# Patient Record
Sex: FEMALE | Race: WHITE
[De-identification: names, ages, dates, MRNs, and addresses within clinical notes are randomized per-mention and may not be internally consistent; named-entity substitution may affect disease eponyms.]

---

## 2018-01-08 ENCOUNTER — HOSPITAL ENCOUNTER (OUTPATIENT)
Dept: HOSPITAL 42 - ED | Age: 28
Setting detail: OBSERVATION
LOS: 1 days | Discharge: HOME | End: 2018-01-09
Attending: INTERNAL MEDICINE | Admitting: INTERNAL MEDICINE
Payer: MEDICAID

## 2018-01-08 VITALS — BODY MASS INDEX: 43 KG/M2

## 2018-01-08 DIAGNOSIS — D50.9: Primary | ICD-10-CM

## 2018-01-08 DIAGNOSIS — Z98.84: ICD-10-CM

## 2018-01-08 DIAGNOSIS — Z87.891: ICD-10-CM

## 2018-01-08 LAB
ALBUMIN SERPL-MCNC: 4.2 G/DL (ref 3–4.8)
ALBUMIN/GLOB SERPL: 1.2 {RATIO} (ref 1.1–1.8)
ALT SERPL-CCNC: 33 U/L (ref 7–56)
APTT BLD: 29.3 SECONDS (ref 25.1–36.5)
AST SERPL-CCNC: 31 U/L (ref 14–36)
BASOPHILS # BLD AUTO: 0.02 K/MM3 (ref 0–2)
BASOPHILS NFR BLD: 0.2 % (ref 0–3)
BUN SERPL-MCNC: 13 MG/DL (ref 7–21)
CALCIUM SERPL-MCNC: 8.4 MG/DL (ref 8.4–10.5)
EOSINOPHIL # BLD: 0.3 10*3/UL (ref 0–0.7)
EOSINOPHIL NFR BLD: 3.9 % (ref 1.5–5)
ERYTHROCYTE [DISTWIDTH] IN BLOOD BY AUTOMATED COUNT: 19.1 % (ref 11.5–14.5)
GFR NON-AFRICAN AMERICAN: > 60
GRANULOCYTES # BLD: 4.68 10*3/UL (ref 1.4–6.5)
GRANULOCYTES NFR BLD: 57.7 % (ref 50–68)
HGB BLD-MCNC: 8.1 G/DL (ref 12–16)
INR PPP: 0.98 (ref 0.93–1.08)
LYMPHOCYTES # BLD: 2.5 10*3/UL (ref 1.2–3.4)
LYMPHOCYTES NFR BLD AUTO: 30.1 % (ref 22–35)
MCH RBC QN AUTO: 19.1 PG (ref 25–35)
MCHC RBC AUTO-ENTMCNC: 28.4 G/DL (ref 31–37)
MCV RBC AUTO: 67.4 FL (ref 80–105)
MONOCYTES # BLD AUTO: 0.7 10*3/UL (ref 0.1–0.6)
MONOCYTES NFR BLD: 8.1 % (ref 1–6)
PLATELET # BLD: 147 10^3/UL (ref 120–450)
PROTHROMBIN TIME: 11.2 SECONDS (ref 9.4–12.5)
RBC # BLD AUTO: 4.23 10^6/UL (ref 3.5–6.1)
WBC # BLD AUTO: 8.1 10^3/UL (ref 4.5–11)

## 2018-01-08 PROCEDURE — 85041 AUTOMATED RBC COUNT: CPT

## 2018-01-08 PROCEDURE — 36430 TRANSFUSION BLD/BLD COMPNT: CPT

## 2018-01-08 PROCEDURE — 82746 ASSAY OF FOLIC ACID SERUM: CPT

## 2018-01-08 PROCEDURE — 36415 COLL VENOUS BLD VENIPUNCTURE: CPT

## 2018-01-08 PROCEDURE — 85730 THROMBOPLASTIN TIME PARTIAL: CPT

## 2018-01-08 PROCEDURE — 86920 COMPATIBILITY TEST SPIN: CPT

## 2018-01-08 PROCEDURE — 74177 CT ABD & PELVIS W/CONTRAST: CPT

## 2018-01-08 PROCEDURE — 83540 ASSAY OF IRON: CPT

## 2018-01-08 PROCEDURE — 85610 PROTHROMBIN TIME: CPT

## 2018-01-08 PROCEDURE — 99282 EMERGENCY DEPT VISIT SF MDM: CPT

## 2018-01-08 PROCEDURE — 83550 IRON BINDING TEST: CPT

## 2018-01-08 PROCEDURE — 85014 HEMATOCRIT: CPT

## 2018-01-08 PROCEDURE — 85025 COMPLETE CBC W/AUTO DIFF WBC: CPT

## 2018-01-08 PROCEDURE — 82607 VITAMIN B-12: CPT

## 2018-01-08 PROCEDURE — 76856 US EXAM PELVIC COMPLETE: CPT

## 2018-01-08 PROCEDURE — 83021 HEMOGLOBIN CHROMOTOGRAPHY: CPT

## 2018-01-08 PROCEDURE — 82728 ASSAY OF FERRITIN: CPT

## 2018-01-08 PROCEDURE — 86850 RBC ANTIBODY SCREEN: CPT

## 2018-01-08 PROCEDURE — 80053 COMPREHEN METABOLIC PANEL: CPT

## 2018-01-08 PROCEDURE — 85018 HEMOGLOBIN: CPT

## 2018-01-08 PROCEDURE — 86900 BLOOD TYPING SEROLOGIC ABO: CPT

## 2018-01-08 NOTE — ED PDOC
Arrival/HPI





- General


Chief Complaint: Abnormal Labs


Time Seen by Provider: 01/08/18 20:53


Historian: Patient





- History of Present Illness


Narrative History of Present Illness (Text): 


01/08/18 23:48





A 27 year old female, whose past medical history includes anemia, presents to 

the emergency department after being referred by her PMD to come in for a blood 

transfusion. The patient notes that she has just started iron supplementation 

by her PMD yesterday. The patient states that she walks short distances and 

becomes tired. The patient denies fevers, chills, headache, dizziness, chest 

pain, shortness of breath, cough, abdominal pain, nausea, vomiting, diarrhea, 

back pain, neck pain, urinary/bowel changes, or any other complaint.





PMDP: Dr. NICOLE Perera


Time/Duration: Other (Today)


Symptom Onset: Sudden


Symptom Course: Unchanged


Activities at Onset: Rest, Light


Context: Home





Past Medical History





- Provider Review


Nursing Documentation Reviewed: Yes





- Infectious Disease


Hx of Infectious Diseases: None





- Hematological/Oncological


Hx Anemia: Yes





- Psychiatric


Hx Substance Use: No





- Anesthesia


Hx Anesthesia: No





Family/Social History





- Physician Review


Nursing Documentation Reviewed: Yes


Family/Social History: No Known Family HX


Smoking Status: Current Some Days Smoker


Hx Alcohol Use: Yes


Frequency of alcohol use: Socially


Hx Substance Use: No





Allergies/Home Meds


Allergies/Adverse Reactions: 


Allergies





seafood Allergy (Uncoded 01/08/18 20:00)


 RASH








Home Medications: 


 Home Meds











 Medication  Instructions  Recorded  Confirmed


 


No Known Home Med  01/08/18 01/08/18














Review of Systems





- Physician Review


All systems were reviewed & negative as marked: Yes





- Review of Systems


Constitutional: absent: Fevers, Night Sweats


Respiratory: absent: SOB


Cardiovascular: absent: Chest Pain


Gastrointestinal: absent: Abdominal Pain, Diarrhea, Nausea, Vomiting


Genitourinary Female: absent: Urine Output Changes


Musculoskeletal: absent: Back Pain, Neck Pain


Neurological: absent: Headache, Dizziness


Hemo/Lymphatic: Other (PMD referred pateint for transfusion.)





Physical Exam


Vital Signs Reviewed: Yes


Vital Signs











  Temp Pulse Resp BP Pulse Ox


 


 01/09/18 07:07  98.6 F  62  18  122/96 H 


 


 01/09/18 07:05   78  18  111/78  98


 


 01/09/18 05:31  98.2 F  61  16  126/87 


 


 01/09/18 04:46  98.4 F  62  15  122/71 


 


 01/09/18 04:25  98.4 F  65  18  125/71 


 


 01/09/18 00:00  98.6 F  62  17  130/85  100


 


 01/08/18 22:00  98.6 F  60  15  131/72  100


 


 01/08/18 20:46  98.6 F  63  16  120/70  100


 


 01/08/18 19:55  98.7 F  58 L  16  134/83  99











Temperature: Afebrile


Blood Pressure: Normal


Pulse: Bradycardic


Respiratory Rate: Normal


Appearance: Positive for: Well-Appearing, Non-Toxic, Comfortable


Pain Distress: None


Mental Status: Positive for: Alert and Oriented X 3





- Systems Exam


Head: Present: Atraumatic, Normocephalic


Pupils: Present: PERRL


Extroacular Muscles: Present: EOMI


Conjunctiva: Present: Normal


Mouth: Present: Moist Mucous Membranes


Neck: Present: Normal Range of Motion


Respiratory/Chest: Present: Clear to Auscultation, Good Air Exchange.  No: 

Respiratory Distress, Accessory Muscle Use


Cardiovascular: Present: Regular Rate and Rhythm, Normal S1, S2.  No: Murmurs


Abdomen: Present: Normal Bowel Sounds.  No: Tenderness, Distention, Peritoneal 

Signs


Back: Present: Normal Inspection


Upper Extremity: Present: Normal Inspection.  No: Cyanosis, Edema


Lower Extremity: Present: Normal Inspection.  No: Edema


Neurological: Present: GCS=15, CN II-XII Intact, Speech Normal


Skin: Present: Warm, Dry, Normal Color.  No: Rashes


Psychiatric: Present: Alert, Oriented x 3, Normal Insight, Normal Concentration





Medical Decision Making


ED Course and Treatment: 


01/08/18 23:52





Impression:


A 27 year old female presents to the emergency department after being referred 

by her PMD to receive a blood transfusion. 





Plan:


-- Labs


-- Reassess and disposition





Progress Notes:











- Lab Interpretations


Lab Results: 








 01/08/18 21:15 





 01/08/18 21:15 





 Lab Results





01/08/18 23:00: Iron 15 L, TIBC 410, % Saturation 4 L


01/08/18 23:00: Ferritin 4.3, Vitamin B12 Pending, Folate Pending


01/08/18 21:15: Blood Type O POSITIVE, Antibody Screen Negative, Crossmatch See 

Detail, BBK History Checked No verified bt


01/08/18 21:15: Sodium 142, Potassium 4.1, Chloride 109 H, Carbon Dioxide 24, 

Anion Gap 13, BUN 13, Creatinine 0.6 L, Est GFR ( Amer) > 60, Est GFR (

Non-Af Amer) > 60, Random Glucose 92, Calcium 8.4, Total Bilirubin 0.3, AST 31, 

ALT 33, Alkaline Phosphatase 91, Total Protein 7.7, Albumin 4.2, Globulin 3.5, 

Albumin/Globulin Ratio 1.2


01/08/18 21:15: PT 11.2, INR 0.98, APTT 29.3


01/08/18 21:15: WBC 8.1, RBC 4.23, Hgb 8.1 L, Hct 28.5 L, MCV 67.4 L, MCH 19.1 L

, MCHC 28.4 L, RDW 19.1 H, Plt Count 147, Gran % 57.7, Lymph % (Auto) 30.1, 

Mono % (Auto) 8.1 H, Eos % (Auto) 3.9, Baso % (Auto) 0.2, Gran # 4.68, Lymph # 

2.5, Mono # 0.7 H, Eos # 0.3, Baso # 0.02








I have reviewed the lab results: Yes





- Medication Orders


Current Medication Orders: 








Pantoprazole Sodium (Protonix Ec Tab)  40 mg PO 0600 ARELI


   Last Admin: 01/09/18 08:10  Dose:  





Discontinued Medications





Iron Sucrose 200 mg/ Sodium (Chloride)  110 mls @ 110 mls/hr IVPB ONCE ONE


   Stop: 01/09/18 10:59











- Scribe Statement


The provider has reviewed the documentation as recorded by the Irish Gonzalez 





Provider Scribe Attestation:


All medical record entries made by the Scribe were at my direction and 

personally dictated by me. I have reviewed the chart and agree that the record 

accurately reflects my personal performance of the history, physical exam, 

medical decision making, and the department course for this patient. I have 

also personally directed, reviewed, and agree with the discharge instructions 

and disposition.








Disposition/Present on Arrival





- Present on Arrival


Any Indicators Present on Arrival: No


History of DVT/PE: No


History of Uncontrolled Diabetes: No


Urinary Catheter: No


History of Decub. Ulcer: No


History Surgical Site Infection Following: None





- Disposition


Have Diagnosis and Disposition been Completed?: Yes


Diagnosis: 


 Symptomatic anemia





Disposition: HOSPITALIZED


Disposition Time: 22:40


Condition: STABLE

## 2018-01-09 VITALS
DIASTOLIC BLOOD PRESSURE: 81 MMHG | RESPIRATION RATE: 17 BRPM | OXYGEN SATURATION: 100 % | SYSTOLIC BLOOD PRESSURE: 128 MMHG | HEART RATE: 100 BPM | TEMPERATURE: 98 F

## 2018-01-09 LAB
% IRON SATURATION: 4 % (ref 20–55)
ALBUMIN SERPL-MCNC: 4.1 G/DL (ref 3–4.8)
ALBUMIN/GLOB SERPL: 1.1 {RATIO} (ref 1.1–1.8)
ALT SERPL-CCNC: 25 U/L (ref 7–56)
AST SERPL-CCNC: 25 U/L (ref 14–36)
BASOPHILS # BLD AUTO: 0.02 K/MM3 (ref 0–2)
BASOPHILS NFR BLD: 0.3 % (ref 0–3)
BUN SERPL-MCNC: 12 MG/DL (ref 7–21)
CALCIUM SERPL-MCNC: 8.9 MG/DL (ref 8.4–10.5)
EOSINOPHIL # BLD: 0.3 10*3/UL (ref 0–0.7)
EOSINOPHIL NFR BLD: 4.1 % (ref 1.5–5)
ERYTHROCYTE [DISTWIDTH] IN BLOOD BY AUTOMATED COUNT: 20.2 % (ref 11.5–14.5)
FERRITIN SERPL-MCNC: 4.3 NG/ML
FOLATE SERPL-MCNC: 12.1 NG/ML
GFR NON-AFRICAN AMERICAN: > 60
GRANULOCYTES # BLD: 4.38 10*3/UL (ref 1.4–6.5)
GRANULOCYTES NFR BLD: 59.5 % (ref 50–68)
HGB BLD-MCNC: 8.9 G/DL (ref 12–16)
IRON SERPL-MCNC: 15 UG/DL (ref 45–180)
LYMPHOCYTES # BLD: 2.3 10*3/UL (ref 1.2–3.4)
LYMPHOCYTES NFR BLD AUTO: 31.5 % (ref 22–35)
MCH RBC QN AUTO: 19.5 PG (ref 25–35)
MCHC RBC AUTO-ENTMCNC: 28.6 G/DL (ref 31–37)
MCV RBC AUTO: 68.2 FL (ref 80–105)
MONOCYTES # BLD AUTO: 0.3 10*3/UL (ref 0.1–0.6)
MONOCYTES NFR BLD: 4.6 % (ref 1–6)
PLATELET # BLD: 143 10^3/UL (ref 120–450)
RBC # BLD AUTO: 4.56 10^6/UL (ref 3.5–6.1)
TIBC SERPL-MCNC: 410 UG/DL (ref 265–497)
VIT B12 SERPL-MCNC: 349 PG/ML (ref 239–931)
WBC # BLD AUTO: 7.4 10^3/UL (ref 4.5–11)

## 2018-01-09 NOTE — CP.PCM.HP
<Merrill Shi - Last Filed: 18 19:59>





History of Present Illness





- History of Present Illness


History of Present Illness: 


Ms. Pitts is a 27 year old female with a past medical history significant for 

anemia, recent  and gastric bypass who presents after seeing her PMD 

who recommended she come to the hospital to receive a blood transfusion for her 

anemia as she has had two months of fatigue with activity and generalized 

weakness. Patient states that she can not go about her normal day-to-day 

routine without becoming fatigued. Patient reports that two months ago she 

received an elective  and afterwards experienced heavy bleeding for two 

weeks, often requiring 10-12 pads per day. She endorses that the physicians 

there told her that her blood count was "low" and that she should be evaluated 

for anemia. Patient then went to the hospital for similar complaints and 

eventually signed out AMA before any testing, including pelvic ultrasound, 

could be performed. She does note that her PMD recently started her on iron 

supplementation yesterday. Patient does endorse that she has had periods of 

palpitations that are self limited as well as recently losing a tooth. Patient 

denies fevers, chills, headache, changes in her vision, dizziness, chest pain, 

shortness of breath, cough, wheezing, abdominal pain, N/V, diarrhea, melena, 

hematochezia, burning/pain with urination, back pain, neck pain/stiffness, or 

any numbness/tingling/weakness of any extremity.





PMH: Anemia


PSH: Gastric Bypass, D&C 


Family History: Denies any family history of any bleeding or coagulation 

disorders


Social History: Endorses social tobacco and alcohol consumption, denies illicit 

drug use; works part time for [employer]; lives at home with her mother


Allergies: Seafood


Home Medications: As per MAR





Present on Admission





- Present on Admission


Any Indicators Present on Admission: No





Review of Systems





- Review of Systems


Review of Systems: 


As stated in HPI, otherwise negative





Past Patient History





- Infectious Disease


Hx of Infectious Diseases: None





- Past Social History


Smoking Status: Current Some Days Smoker





- HEMATOLOGICAL/ONCOLOGICAL


Hx Anemia: Yes





- PSYCHIATRIC


Hx Substance Use: No





- ANESTHESIA


Hx Anesthesia: No





Meds


Home Medications: 


 Home Medication List











 Medication  Instructions  Recorded  Confirmed  Type


 


Docusate Sodium [Colace] 100 mg PO BID #60 capsule 18  Rx


 


Ferrous Sulfate 325 mg PO BID #60 tablet 18  Rx











Allergies/Adverse Reactions: 


 Allergies











Allergy/AdvReac Type Severity Reaction Status Date / Time


 


seafood Allergy  RASH Uncoded 18 12:25














Physical Exam





- Constitutional


Appears: Non-toxic, No Acute Distress





- Head Exam


Head Exam: ATRAUMATIC, NORMAL INSPECTION, NORMOCEPHALIC





- Eye Exam


Eye Exam: EOMI, Normal appearance, PERRL.  absent: Conjunctival injection, 

Nystagmus, Periorbital swelling, Periorbital tenderness, Scleral icterus


Pupil Exam: NORMAL ACCOMODATION, PERRL.  absent: Fixed, Irregular, Miosis, 

Mydriatic, Unequal





- ENT Exam


ENT Exam: Mucous Membranes Moist, Normal Exam, Normal External Ear Exam, Normal 

Oropharynx.  absent: Mucous Membranes Dry





- Neck Exam


Neck exam: Positive for: Full Rom, Normal Inspection.  Negative for: 

Lymphadenopathy, Meningismus, Tenderness, Thyromegaly





- Respiratory Exam


Respiratory Exam: Clear to Auscultation Bilateral, NORMAL BREATHING PATTERN.  

absent: Accessory Muscle Use, Chest Wall Tenderness, Decreased Breath Sounds, 

Prolonged Expiratory Phase, Rales, Rhonchi, Wheezes, Respiratory Distress, 

Stridor





- Cardiovascular Exam


Cardiovascular Exam: REGULAR RHYTHM, RRR, +S1, +S2.  absent: Bradycardia, 

Tachycardia, Clicks, Diastolic murmur, Gallop, Irregular Rhythm, JVD, Rubs, +S4

, Systolic Murmur





- GI/Abdominal Exam


GI & Abdominal Exam: Normal Bowel Sounds, Soft.  absent: Bruit, Diminished 

Bowel Sounds, Distended, Firm, Guarding, Hernia, Hyperactive Bowel Sounds, 

Hypoactive Bowel Sounds, Mass, Organomegaly, Pulsatile Mass, Rebound, Rigid, 

Tenderness





- Extremities Exam


Extremities exam: Positive for: full ROM, normal capillary refill, normal 

inspection, pedal pulses present.  Negative for: calf tenderness, joint swelling

, pedal edema, tenderness





- Back Exam


Back exam: FULL ROM, NORMAL INSPECTION.  absent: CVA tenderness (L), CVA 

tenderness (R), muscle spasm, paraspinal tenderness, rash noted, tenderness, 

vertebral tenderness





- Neurological Exam


Neurological exam: Alert, CN II-XII Intact, Normal Gait, Oriented x3





- Psychiatric Exam


Psychiatric exam: Normal Affect, Normal Mood





- Skin


Skin Exam: Dry, Intact, Normal Color, Warm





Results





- Vital Signs


Recent Vital Signs: 





 Last Vital Signs











Temp  98.6 F   01/09/18 00:00


 


Pulse  62   18 00:00


 


Resp  17   18 00:00


 


BP  130/85   18 00:00


 


Pulse Ox  100   18 00:00














- Labs


Result Diagrams: 


 18 09:20





 18 09:20


Labs: 





 Laboratory Results - last 24 hr











  18





  23:38


 


Blood Type Confirm  O POSITIVE














Assessment & Plan





- Assessment and Plan (Free Text)


Assessment: 


27 year old female with a past medical history significant for anemia, recent 

 and gastric bypass who presents after seeing her PMD who recommended 

she come to the hospital to receive a blood transfusion for her anemia as she 

has had two months of fatigue with activity and generalized weakness. Patient 

found to have microcytic hypochromic anemia on CBC.


Plan: 


1. Microcytic Hypochromic Anemia


-H/H: 8.1/28.5; MCV: 67.4; MCH: 19.1; RDW: 19.1


-Coagulation studies WNL


-Type and Cross pending


-Iron studies, Hemoglobin electrophoresis, Vitamin B12, Folate, and Peripheral 

Smear pending


-Transfuse 1u pRBC's


-Daily CBC


-Pelvis U/S pending





GI Prophylaxis: Protonix


DVT Prophylaxis: SCD's


Diet: Regular





Patient seen and case discussed in detail with attending, Dr. Roxane Conrad.





- Date & Time


Date: 18


Time: 03:11





Decision To Admit





- Pt Status Changed To:


Hospital Disposition Of: Observation





- .


Bed Request Type: Med/Surg





<Roaxne Conrad - Last Filed: 18 06:04>





Results





- Vital Signs


Recent Vital Signs: 





 Last Vital Signs











Temp  98 F   18 16:00


 


Pulse  100 H  18 16:00


 


Resp  17   18 16:00


 


BP  128/81   18 16:00


 


Pulse Ox  100   18 16:00














- Labs


Result Diagrams: 


 18 09:20





 18 09:20


Labs: 





 Laboratory Results - last 24 hr











  18





  23:00


 


Hemoglobin A  97.4


 


Hemoglobin A2  1.6 L


 


Hemoglobin C  0.0


 


Hemoglobin F (Fetal)  <1.0


 


Hemoglobin S  0.0


 


Variant Hemoglobin  0.0


 


Hemoglobinopathy Interp  See note

## 2018-01-09 NOTE — US
HISTORY:

anemia, s/p bleeding from  Menstrual status: LMP 2017 



COMPARISON:

None available.



TECHNIQUE:

Transabdominal only. Real-time technique with 2D, duplex and color 

Doppler



FINDINGS:



UTERUS:

Measures 4 x 4.5 x 8.8 cm. Normal in size and appearance. No fibroid 

or other mass lesion seen.



ENDOMETRIUM:

Measures 9.3 mm in diameter. No ultrasound findings to suggest 

gestational sac, fluid, debris, mass or polyp or other pathologic 

process within the endometrium. 



CERVIX:

No cervical abnormality identified.



RIGHT OVARY:

Measures 1.9 x 3.9 x 4.7 cm. No solid mass. Normal flow. 



LEFT OVARY:

Measures 1.8 x 3.7 x 3.0 cm. No solid mass. Normal flow. 



FREE FLUID:

No significant free fluid noted.



OTHER FINDINGS:

None. 



IMPRESSION:

Unremarkable pelvic ultrasound.

## 2018-01-09 NOTE — CT
PROCEDURE:  CT Abdomen and Pelvis with contrast



HISTORY:

GI bleed



COMPARISON:

None.



TECHNIQUE:

Contrast dose: 100 cc of Omni 350



Radiation dose:



Total exam DLP = 971 mGy-cm.



This CT exam was performed using one or more of the following dose 

reduction techniques: Automated exposure control, adjustment of the 

mA and/or kV according to patient size, and/or use of iterative 

reconstruction technique.



FINDINGS:



LOWER THORAX:

Surgical clips are seen near the GE junction. 



LIVER:

Unremarkable. No gross lesion or ductal dilatation. 



GALLBLADDER AND BILE DUCTS:

Unremarkable. 



PANCREAS:

Unremarkable. No gross lesion or ductal dilatation.



SPLEEN:

Unremarkable. 



ADRENALS:

Unremarkable. No mass. 



KIDNEYS AND URETERS:

Unremarkable. No hydronephrosis. No solid mass. 



VASCULATURE:

Unremarkable. No aortic aneurysm. 



BOWEL:

Unremarkable. No obstruction. No gross mural thickening. 



APPENDIX:

Normal appendix. 



PERITONEUM:

Unremarkable. No free fluid. No free air. 



LYMPH NODES:

Unremarkable. No enlarged lymph nodes. 



BLADDER:

Unremarkable. 



REPRODUCTIVE:

Unremarkable. 



BONES:

No acute fracture. 



OTHER FINDINGS:

None.



IMPRESSION:

No acute findings

## 2018-01-10 LAB
MCH RBC QN AUTO: 18.7 PG (ref 27–33)
MCV RBC: 65.2 FL (ref 80–100)

## 2018-01-10 NOTE — CP.PCM.DIS
<Dakotah Tee - Last Filed: 01/10/18 04:17>





Provider





- Provider


Date of Admission: 


18 23:27





Attending physician: 


Daniel Carrillo MD





Primary care physician: 





Yarelis Kruger NP [place], [place]


Time Spent in preparation of Discharge (in minutes): 45





Diagnosis





- Discharge Diagnosis


(1) Symptomatic anemia


Status: Acute   Priority: Medium   





Hospital Course





- Lab Results


Lab Results: 


 Most Recent Lab Values











WBC  7.4 10^3/ul (4.5-11.0)   18  09:20    


 


RBC  4.56 10^6/uL (3.5-6.1)   18  09:20    


 


Hgb  8.9 g/dL (12.0-16.0)  L  18  09:20    


 


Hct  31.1 % (36.0-48.0)  L  18  09:20    


 


MCV  68.2 fl (80.0-105.0)  L  18  09:20    


 


MCH  19.5 pg (25.0-35.0)  L  18  09:20    


 


MCHC  28.6 g/dl (31.0-37.0)  L  18  09:20    


 


RDW  20.2 % (11.5-14.5)  H  18  09:20    


 


Plt Count  143 10^3/uL (120.0-450.0)   18  09:20    


 


Gran %  59.5 % (50.0-68.0)   18  09:20    


 


Lymph % (Auto)  31.5 % (22.0-35.0)   18  09:20    


 


Mono % (Auto)  4.6 % (1.0-6.0)   18  09:20    


 


Eos % (Auto)  4.1 % (1.5-5.0)   18  09:20    


 


Baso % (Auto)  0.3 % (0.0-3.0)   18  09:20    


 


Gran #  4.38  (1.4-6.5)   18  09:20    


 


Lymph #  2.3  (1.2-3.4)   18  09:20    


 


Mono #  0.3  (0.1-0.6)   18  09:20    


 


Eos #  0.3  (0.0-0.7)   18  09:20    


 


Baso #  0.02 K/mm3 (0.0-2.0)   18  09:20    


 


Hemoglobinopathy Red Blood Count  4.24 Mill/mcL (3.80-5.10)   18  23:00  

  


 


Hemoglobinopathy Hct  27.6 % (35.0-45.0)  L  18  23:00    


 


Hemoglobinopathy Hgb  7.9 g/dL (11.7-15.5)  L  18  23:00    


 


Hemoglobinopathy MCV  65.2 fL (80.0-100.0)  L  18  23:00    


 


Hemoglobinopathy MCH  18.7 pg (27.0-33.0)  L  18  23:00    


 


Hemoglobinopathy RDW  20.0 % (11.0-15.0)  H  18  23:00    


 


PT  11.2 SECONDS (9.4-12.5)   18  21:15    


 


INR  0.98  (0.93-1.08)   18  21:15    


 


APTT  29.3 Seconds (25.1-36.5)   18  21:15    


 


Sodium  143 mmol/L (132-148)   18  09:20    


 


Potassium  3.7 mmol/L (3.6-5.0)   18  09:20    


 


Chloride  109 mmol/L ()  H  18  09:20    


 


Carbon Dioxide  22 mmol/L (21-33)   18  09:20    


 


Anion Gap  17  (10-20)   18  09:20    


 


BUN  12 mg/dL (7-21)   18  09:20    


 


Creatinine  0.5 mg/dl (0.7-1.2)  L  18  09:20    


 


Est GFR ( Amer)  > 60   18  09:20    


 


Est GFR (Non-Af Amer)  > 60   18  09:20    


 


Random Glucose  139 mg/dL ()  H  18  09:20    


 


Calcium  8.9 mg/dL (8.4-10.5)   18  09:20    


 


Iron  15 ug/dL ()  L  18  23:00    


 


TIBC  410 ug/dL (265-497)   18  23:00    


 


% Saturation  4 % (20-55)  L  18  23:00    


 


Ferritin  4.3 ng/mL  18  23:00    


 


Total Bilirubin  0.7 mg/dL (0.2-1.3)   18  09:20    


 


AST  25 U/L (14-36)   18  09:20    


 


ALT  25 U/L (7-56)   18  09:20    


 


Alkaline Phosphatase  77 U/L ()   18  09:20    


 


Total Protein  7.7 g/dL (5.8-8.3)   18  09:20    


 


Albumin  4.1 g/dL (3.0-4.8)   18  09:20    


 


Globulin  3.6 gm/dL  18  09:20    


 


Albumin/Globulin Ratio  1.1  (1.1-1.8)   18  09:20    


 


Vitamin B12  349 pg/mL (239-931)   18  23:00    


 


Folate  12.1 ng/mL  18  23:00    


 


Blood Type  O POSITIVE   18  21:15    


 


Blood Type Confirm  O POSITIVE   18  23:38    


 


Antibody Screen  Negative   18  21:15    


 


Crossmatch  See Detail   18  21:15    


 


BBK History Checked  No verified bt   18  21:15    














- Hospital Course


Hospital Course: 





Ms. Pitts is a 27 year old female with a past medical history significant for 

anemia, two abortions with recent  in the past 3 weeks and gastric 

bypass who presents after seeing her PMD who recommended she come to the 

hospital to receive a blood transfusion for her anemia as she has had 

experienced months of fatigue with activity and generalized weakness. Patient 

states that she can not go about her normal day-to-day routine without becoming 

fatigued. Patient reports that one month ago she received an elective  

and afterwards experienced heavy bleeding for two weeks, often requiring 10-12 

pads per day. She endorses that the physicians there told her that her blood 

count was "low" and that she should be evaluated for anemia. Patient then went 

to the hospital for similar complaints and eventually signed out AMA before any 

testing, including pelvic ultrasound, could be performed. Patient also endorsed 

one year of bloody bowel movements that would occur on a daily basis. Patient 

states she never endorsed this to previous healthcare providers and felt it was 

due mainly to constipation. States her mother's side of the family is 

significant for cancer but is unsure of what type of cancers. She is not 

familiar with her father and therefore does not know his or his family's 

history. During course of hospital stay patient received both a pelvic 

ultrasound and CT of abdomen and pelvis. Both results were negative for any 

acute findings. Patient was supplemented with iron while at the hospital and 

given prescriptions for iron and colace upon discharge. Patient was given 

instructions to provide both her OBGYN with whom she has an appointment this 

week and her primary care physician with whom she will see next week, to 

provide imaging findings regarding this hospital visit. Patient also instructed 

to follow up her anemia with further evaluation per primary care physician as 

well as further iron therapy. Since patient was hemodynamically stable and 

asymptomatic patient was cleared for discharge. Patient was in agreement with 

plan of discharge and therefore discharged.





Case reviewed and discussed with Dr. Sun Tee PGY1





Discharge Exam





- Head Exam


Head Exam: ATRAUMATIC, NORMAL INSPECTION, NORMOCEPHALIC





- Eye Exam


Eye Exam: EOMI, Normal appearance


Pupil Exam: NORMAL ACCOMODATION





- ENT Exam


ENT Exam: Mucous Membranes Moist





- Neck Exam


Neck exam: Normal Inspection





- Respiratory Exam


Respiratory Exam: NORMAL BREATHING PATTERN, UNREMARKABLE





- Cardiovascular Exam


Cardiovascular Exam: REGULAR RHYTHM, +S1, +S2





- GI/Abdominal Exam


GI & Abdominal Exam: Normal Bowel Sounds, Unremarkable





- Rectal Exam


Rectal Exam: NORMAL INSPECTION





- Neurological Exam


Neurological exam: Alert, Oriented x3





- Psychiatric Exam


Psychiatric exam: Normal Affect, Normal Mood





- Skin


Skin Exam: Intact, Normal Color, Warm





Discharge Plan





- Discharge Medications


Prescriptions: 


Docusate Sodium [Colace] 100 mg PO BID #60 capsule


Ferrous Sulfate 325 mg PO BID #60 tablet





- Follow Up Plan


Condition: STABLE


Disposition: HOME/ ROUTINE


Instructions:  Anemia (GEN)


Additional Instructions: 


1. Please follow up with your PMD within 3-5 days regarding this hospital visit 

as well as for further iron therapy or obtain a referral for hematology/

oncology from your primary care physician. Make sure to bring the CT abdomen/

pelvis and pelvic ultrasound results with you. 


2. Please follow up with your OBGYN as you already have planned considering 

your appointment is this week. Please be sure to bring the results of your 

pelvic ultrasound and CT abdomen/pelvis with you for your OBGYN appointment. 


3. Fill and take medications and prescribed.


4. If symptoms worsen do not hesitate to return to the emergency department. 





<Shelley Garsia - Last Filed: 01/10/18 14:02>





Provider





- Provider


Date of Admission: 


18 22:57





Attending physician: 


Daniel Carrillo MD








Hospital Course





- Lab Results


Lab Results: 


 Most Recent Lab Values











WBC  7.4 10^3/ul (4.5-11.0)   18  09:20    


 


RBC  4.56 10^6/uL (3.5-6.1)   18  09:20    


 


Hgb  8.9 g/dL (12.0-16.0)  L  18  09:20    


 


Hct  31.1 % (36.0-48.0)  L  18  09:20    


 


MCV  68.2 fl (80.0-105.0)  L  18  09:20    


 


MCH  19.5 pg (25.0-35.0)  L  18  09:20    


 


MCHC  28.6 g/dl (31.0-37.0)  L  18  09:20    


 


RDW  20.2 % (11.5-14.5)  H  18  09:20    


 


Plt Count  143 10^3/uL (120.0-450.0)   18  09:20    


 


Gran %  59.5 % (50.0-68.0)   18  09:20    


 


Lymph % (Auto)  31.5 % (22.0-35.0)   18  09:20    


 


Mono % (Auto)  4.6 % (1.0-6.0)   18  09:20    


 


Eos % (Auto)  4.1 % (1.5-5.0)   18  09:20    


 


Baso % (Auto)  0.3 % (0.0-3.0)   18  09:20    


 


Gran #  4.38  (1.4-6.5)   18  09:20    


 


Lymph #  2.3  (1.2-3.4)   18  09:20    


 


Mono #  0.3  (0.1-0.6)   18  09:20    


 


Eos #  0.3  (0.0-0.7)   18  09:20    


 


Baso #  0.02 K/mm3 (0.0-2.0)   18  09:20    


 


Hemoglobinopathy Red Blood Count  4.24 Mill/mcL (3.80-5.10)   18  23:00  

  


 


Hemoglobinopathy Hct  27.6 % (35.0-45.0)  L  18  23:00    


 


Hemoglobinopathy Hgb  7.9 g/dL (11.7-15.5)  L  18  23:00    


 


Hemoglobinopathy MCV  65.2 fL (80.0-100.0)  L  18  23:00    


 


Hemoglobinopathy MCH  18.7 pg (27.0-33.0)  L  18  23:00    


 


Hemoglobinopathy RDW  20.0 % (11.0-15.0)  H  18  23:00    


 


PT  11.2 SECONDS (9.4-12.5)   18  21:15    


 


INR  0.98  (0.93-1.08)   18  21:15    


 


APTT  29.3 Seconds (25.1-36.5)   18  21:15    


 


Sodium  143 mmol/L (132-148)   18  09:20    


 


Potassium  3.7 mmol/L (3.6-5.0)   18  09:20    


 


Chloride  109 mmol/L ()  H  18  09:20    


 


Carbon Dioxide  22 mmol/L (21-33)   18  09:20    


 


Anion Gap  17  (10-20)   18  09:20    


 


BUN  12 mg/dL (7-21)   18  09:20    


 


Creatinine  0.5 mg/dl (0.7-1.2)  L  18  09:20    


 


Est GFR ( Amer)  > 60   18  09:20    


 


Est GFR (Non-Af Amer)  > 60   18  09:20    


 


Random Glucose  139 mg/dL ()  H  18  09:20    


 


Calcium  8.9 mg/dL (8.4-10.5)   18  09:20    


 


Iron  15 ug/dL ()  L  18  23:00    


 


TIBC  410 ug/dL (265-497)   18  23:00    


 


% Saturation  4 % (20-55)  L  18  23:00    


 


Ferritin  4.3 ng/mL  18  23:00    


 


Total Bilirubin  0.7 mg/dL (0.2-1.3)   18  09:20    


 


AST  25 U/L (14-36)   18  09:20    


 


ALT  25 U/L (7-56)   18  09:20    


 


Alkaline Phosphatase  77 U/L ()   18  09:20    


 


Total Protein  7.7 g/dL (5.8-8.3)   18  09:20    


 


Albumin  4.1 g/dL (3.0-4.8)   18  09:20    


 


Globulin  3.6 gm/dL  18  09:20    


 


Albumin/Globulin Ratio  1.1  (1.1-1.8)   18  09:20    


 


Vitamin B12  349 pg/mL (239-931)   18  23:00    


 


Folate  12.1 ng/mL  18  23:00    


 


Blood Type  O POSITIVE   18  21:15    


 


Blood Type Confirm  O POSITIVE   18  23:38    


 


Antibody Screen  Negative   18  21:15    


 


Crossmatch  See Detail   18  21:15    


 


BBK History Checked  No verified bt   18  21:15    














Attending/Attestation





- Attestation


I have personally seen and examined this patient.: Yes


I have fully participated in the care of the patient.: Yes


I have reviewed all pertinent clinical information, including history, physical 

exam and plan: Yes


Notes (Text): 





01/10/18 13:57





Attending note;





Patient seen and examined with resident.





Patient is a 27-year-old female with a past medical history of gastric bypass 

surgery 10 years ago, anemia not on treatment, 2 abortions recent 2 months ago 

is admitted with symptomatic anemia. Hemoglobin is 7.8 from PMDs office.


1 unit PRBC transfusion given. Hemoglobin is 9.1. Symptoms resolved.


Pelvic ultrasound is negative.


History of questionable hemorrhoids/obstipation and intermittent blood in the 

stool. CT is negative.


Patient was advised to follow-up with GI per PMD.


Currently no active bleeding. No abdominal pain. No GYN bleeding.


Tolerating diet.





IV Venofer 1 dose given.





Prescriptions for ferrous sulfate and Colace given.





Patient is strongly advised to follow-up with PMD in 2 days.


Needs outpatient GYN and GI evaluation. Patient agreed to do the same.





Diagnosis;


Anemia


History of gastric bypass

## 2018-01-11 LAB
HEMOGLOBIN A: 97.4 PERCENT (ref 96–?)
HGB A2 MFR BLD COLUMN CHROM: 1.6 PERCENT (ref 1.8–3.5)
HGB C MFR BLD: 0 PERCENT (ref 0–0)
HGB S MFR BLD: 0 PERCENT (ref 0–0)

## 2019-03-03 ENCOUNTER — HOSPITAL ENCOUNTER (EMERGENCY)
Dept: HOSPITAL 42 - ED | Age: 29
Discharge: LEFT BEFORE DISCHARGE | End: 2019-03-03
Payer: SELF-PAY

## 2019-03-03 VITALS
HEART RATE: 55 BPM | SYSTOLIC BLOOD PRESSURE: 133 MMHG | TEMPERATURE: 98.4 F | OXYGEN SATURATION: 98 % | RESPIRATION RATE: 18 BRPM | DIASTOLIC BLOOD PRESSURE: 85 MMHG

## 2019-03-03 VITALS — BODY MASS INDEX: 43 KG/M2

## 2019-03-03 DIAGNOSIS — R11.2: Primary | ICD-10-CM

## 2019-03-03 DIAGNOSIS — D64.9: ICD-10-CM

## 2019-03-03 DIAGNOSIS — Z98.84: ICD-10-CM

## 2019-03-03 NOTE — ED PDOC
Arrival/HPI





- General


Chief Complaint: Abdominal Pain


Time Seen by Provider: 03/03/19 20:24


Historian: Patient





- History of Present Illness


Narrative History of Present Illness (Text): 





03/03/19 21:19


28F w/ a PMH of anemia, abortions, gastric bypass presenting to [facility] ED w/ 

complaints of Nausea/Vomiting + Mild abdominal pain x3 days. Patient reported 

N/V has been worsening over the past 3 days requiring her to call out of work; 

describes abdominal pain as a diffuse periumbilical pain w/ radiation to her 

back, stabbing in nature. Abdominal pain is worsened w/ food. Patient denies any

changes in bowel movement including diarrhea, constipation, hematochezia/ 

melena. Emesis has been NBNB in nature. Does complain of associated headache at 

this time. Offers no  urinary complaints. Remainder 12 system ROS is otherwise 

negative. 


03/03/19 21:22





Time/Duration: Prior to Arrival, < week


Symptom Course: Worsening


Quality: Stabbing





Past Medical History





- Provider Review


Nursing Documentation Reviewed: Yes





- Infectious Disease


Hx of Infectious Diseases: None





- Cardiac


Hx Cardiac Disorders: No





- Pulmonary


Hx Respiratory Disorders: No





- Neurological


Hx Neurological Disorder: No





- HEENT


Hx HEENT Disorder: No





- Renal


Hx Renal Disorder: No





- Endocrine/Metabolic


Hx Endocrine Disorders: No





- Hematological/Oncological


Hx Anemia: Yes





- Integumentary


Hx Dermatological Disorder: No





- Musculoskeletal/Rheumatological


Hx Musculoskeletal Disorders: No


Hx Falls: No





- Gastrointestinal


Hx Gastrointestinal Disorders: Yes (BLACK STOOL 1-8-18)





- Genitourinary/Gynecological


Hx Genitourinary Disorders: No





- Psychiatric


Hx Psychophysiologic Disorder: No


Hx Substance Use: No





- Anesthesia


Hx Anesthesia: No





Family/Social History





- Physician Review


Nursing Documentation Reviewed: Yes


Family/Social History: No Known Family HX


Smoking Status: Current Some Days Smoker


Hx Alcohol Use: Yes (SOCIALLY)


Hx Substance Use: No





Allergies/Home Meds


Allergies/Adverse Reactions: 


Allergies





seafood Allergy (Uncoded 03/03/19 20:45)


   ANAPHYLAXIS








Home Medications: 


                                    Home Meds











 Medication  Instructions  Recorded  Confirmed


 


No Known Home Med  03/03/19 03/03/19














Review of Systems





- Review of Systems


Constitutional: Normal


Eyes: Normal


ENT: Normal


Respiratory: Normal


Cardiovascular: Normal


Gastrointestinal: Abdominal Pain, Nausea, Vomiting, Appetite Changes.  absent: 

Stool Changes, Constipation, Diarrhea, Hematochezia, Hematemesis


Genitourinary Female: Normal


Musculoskeletal: Normal


Skin: Normal


Neurological: Normal


Endocrine: Normal


Hemo/Lymphatic: Normal


Psychiatric: Normal





Physical Exam


Vital Signs Reviewed: Yes





Vital Signs











  Temp Pulse Resp BP Pulse Ox


 


 03/03/19 20:42  98.4 F  55 L  18  133/85  98











Temperature: Afebrile


Blood Pressure: Normal


Pulse: Regular


Respiratory Rate: Normal


Appearance: Positive for: Well-Appearing, Non-Toxic, Comfortable


Pain Distress: None


Mental Status: Positive for: Alert and Oriented X 3





- Systems Exam


Head: Present: Atraumatic, Normocephalic


Pupils: Present: PERRL


Extroacular Muscles: Present: EOMI


Mouth: Present: Moist Mucous Membranes


Respiratory/Chest: Present: Clear to Auscultation, Good Air Exchange.  No: 

Respiratory Distress, Accessory Muscle Use


Cardiovascular: Present: Regular Rate and Rhythm, Normal S1, S2.  No: Murmurs


Abdomen: Present: Tenderness (very mild tenderness in the epigastric / 

periumbilical area to DEEP palpation), Normal Bowel Sounds, Other ([place] sign 

negative).  No: Distention, Peritoneal Signs


Upper Extremity: Present: Normal Inspection.  No: Cyanosis, Edema


Lower Extremity: Present: Normal Inspection.  No: Edema


Neurological: Present: GCS=15, CN II-XII Intact, Speech Normal


Skin: Present: Warm, Dry, Normal Color.  No: Rashes


Psychiatric: Present: Alert, Oriented x 3, Normal Insight, Normal Concentration





Medical Decision Making


ED Course and Treatment: 





03/03/19 21:29





Impression: 28F w/ Epigastric/ Periumbilical Abd Pain N/V worsening over the 

past 3 days


Patient left AMA prior to completing the following lab work/ treatment plan 





CBC/CMP


UA


LIPASE





TORADOL


ZOFRAN





risks vs benefits of ED evaluation explained to patient


Patient understands risks/ benefits 


Patient wants to sign out AMA





- Medication Orders


Current Medication Orders: 











Ondansetron HCl (Zofran Inj)  4 mg IVP STAT STA


   Stop: 03/03/19 21:19











Disposition/Present on Arrival





- Present on Arrival


Any Indicators Present on Arrival: No


History of DVT/PE: No


History of Uncontrolled Diabetes: No


Urinary Catheter: No


History of Decub. Ulcer: No


History Surgical Site Infection Following: None





- Disposition


Have Diagnosis and Disposition been Completed?: Yes


Diagnosis: 


 Nausea & vomiting





Disposition: AGAINST MEDICAL ADVICE


Disposition Time: 21:49


Condition: STABLE


Referrals: 


FAMILY PROVIDER,NO [Primary Care Provider] - Follow up with primary